# Patient Record
Sex: MALE | Race: WHITE | NOT HISPANIC OR LATINO | Employment: FULL TIME | URBAN - NONMETROPOLITAN AREA
[De-identification: names, ages, dates, MRNs, and addresses within clinical notes are randomized per-mention and may not be internally consistent; named-entity substitution may affect disease eponyms.]

---

## 2018-08-08 ENCOUNTER — OFFICE VISIT (OUTPATIENT)
Dept: FAMILY MEDICINE CLINIC | Facility: CLINIC | Age: 33
End: 2018-08-08

## 2018-08-08 ENCOUNTER — LAB (OUTPATIENT)
Dept: LAB | Facility: HOSPITAL | Age: 33
End: 2018-08-08

## 2018-08-08 VITALS
BODY MASS INDEX: 41.13 KG/M2 | HEIGHT: 69 IN | OXYGEN SATURATION: 99 % | HEART RATE: 70 BPM | WEIGHT: 277.7 LBS | SYSTOLIC BLOOD PRESSURE: 132 MMHG | DIASTOLIC BLOOD PRESSURE: 88 MMHG

## 2018-08-08 DIAGNOSIS — R03.0 PRE-HYPERTENSION: Primary | ICD-10-CM

## 2018-08-08 DIAGNOSIS — F32.A MILD DEPRESSION: ICD-10-CM

## 2018-08-08 DIAGNOSIS — R03.0 PRE-HYPERTENSION: ICD-10-CM

## 2018-08-08 LAB
ALBUMIN SERPL-MCNC: 4.3 G/DL (ref 3.4–4.8)
ALBUMIN/GLOB SERPL: 1.4 G/DL (ref 1.1–1.8)
ALP SERPL-CCNC: 44 U/L (ref 38–126)
ALT SERPL W P-5'-P-CCNC: 99 U/L (ref 21–72)
ANION GAP SERPL CALCULATED.3IONS-SCNC: 8 MMOL/L (ref 5–15)
ARTICHOKE IGE QN: 63 MG/DL (ref 1–129)
AST SERPL-CCNC: 65 U/L (ref 17–59)
BASOPHILS # BLD AUTO: 0.07 10*3/MM3 (ref 0–0.2)
BASOPHILS NFR BLD AUTO: 1.1 % (ref 0–2)
BILIRUB SERPL-MCNC: 0.5 MG/DL (ref 0.2–1.3)
BUN BLD-MCNC: 14 MG/DL (ref 7–21)
BUN/CREAT SERPL: 16.7 (ref 7–25)
CALCIUM SPEC-SCNC: 8.8 MG/DL (ref 8.4–10.2)
CHLORIDE SERPL-SCNC: 101 MMOL/L (ref 95–110)
CHOLEST SERPL-MCNC: 117 MG/DL (ref 0–199)
CO2 SERPL-SCNC: 29 MMOL/L (ref 22–31)
CREAT BLD-MCNC: 0.84 MG/DL (ref 0.7–1.3)
DEPRECATED RDW RBC AUTO: 39.9 FL (ref 35.1–43.9)
EOSINOPHIL # BLD AUTO: 0.09 10*3/MM3 (ref 0–0.7)
EOSINOPHIL NFR BLD AUTO: 1.4 % (ref 0–7)
ERYTHROCYTE [DISTWIDTH] IN BLOOD BY AUTOMATED COUNT: 13.3 % (ref 11.5–14.5)
GFR SERPL CREATININE-BSD FRML MDRD: 106 ML/MIN/1.73 (ref 70–162)
GLOBULIN UR ELPH-MCNC: 3 GM/DL (ref 2.3–3.5)
GLUCOSE BLD-MCNC: 97 MG/DL (ref 60–100)
HCT VFR BLD AUTO: 42.7 % (ref 39–49)
HDLC SERPL-MCNC: 36 MG/DL (ref 60–200)
HGB BLD-MCNC: 14.6 G/DL (ref 13.7–17.3)
IMM GRANULOCYTES # BLD: 0.07 10*3/MM3 (ref 0–0.02)
IMM GRANULOCYTES NFR BLD: 1.1 % (ref 0–0.5)
LDLC/HDLC SERPL: 1.93 {RATIO} (ref 0–3.55)
LYMPHOCYTES # BLD AUTO: 1.55 10*3/MM3 (ref 0.6–4.2)
LYMPHOCYTES NFR BLD AUTO: 24.1 % (ref 10–50)
MCH RBC QN AUTO: 28 PG (ref 26.5–34)
MCHC RBC AUTO-ENTMCNC: 34.2 G/DL (ref 31.5–36.3)
MCV RBC AUTO: 81.8 FL (ref 80–98)
MONOCYTES # BLD AUTO: 0.58 10*3/MM3 (ref 0–0.9)
MONOCYTES NFR BLD AUTO: 9 % (ref 0–12)
NEUTROPHILS # BLD AUTO: 4.06 10*3/MM3 (ref 2–8.6)
NEUTROPHILS NFR BLD AUTO: 63.3 % (ref 37–80)
PLATELET # BLD AUTO: 280 10*3/MM3 (ref 150–450)
PMV BLD AUTO: 9.5 FL (ref 8–12)
POTASSIUM BLD-SCNC: 4.2 MMOL/L (ref 3.5–5.1)
PROT SERPL-MCNC: 7.3 G/DL (ref 6.3–8.6)
RBC # BLD AUTO: 5.22 10*6/MM3 (ref 4.37–5.74)
SODIUM BLD-SCNC: 138 MMOL/L (ref 137–145)
TRIGL SERPL-MCNC: 57 MG/DL (ref 20–199)
WBC NRBC COR # BLD: 6.42 10*3/MM3 (ref 3.2–9.8)

## 2018-08-08 PROCEDURE — 36415 COLL VENOUS BLD VENIPUNCTURE: CPT

## 2018-08-08 PROCEDURE — 80061 LIPID PANEL: CPT | Performed by: STUDENT IN AN ORGANIZED HEALTH CARE EDUCATION/TRAINING PROGRAM

## 2018-08-08 PROCEDURE — 85025 COMPLETE CBC W/AUTO DIFF WBC: CPT | Performed by: STUDENT IN AN ORGANIZED HEALTH CARE EDUCATION/TRAINING PROGRAM

## 2018-08-08 PROCEDURE — 80053 COMPREHEN METABOLIC PANEL: CPT

## 2018-08-08 PROCEDURE — 99203 OFFICE O/P NEW LOW 30 MIN: CPT | Performed by: STUDENT IN AN ORGANIZED HEALTH CARE EDUCATION/TRAINING PROGRAM

## 2018-08-08 RX ORDER — TETANUS AND DIPHTHERIA TOXOIDS ADSORBED 2; 2 [LF]/.5ML; [LF]/.5ML
0.5 INJECTION INTRAMUSCULAR ONCE
Qty: 0.5 ML | Refills: 0 | Status: SHIPPED | OUTPATIENT
Start: 2018-08-08 | End: 2018-08-08

## 2018-08-08 NOTE — PROGRESS NOTES
Subjective   Barrett Sidhu is a 33 y.o. male who presents for initial evaluation for elevated blood pressure level, lab work for occupational insurance and to establish care. He had one episode of high blood pressure at his dentist office of 146/99 and today is 132/88. He does not complain of any headache, lightheadedness, or changes in his vision. He denies any chest pain or shortness of breath or peripheral edema. He stopped smoking cigarettes 5 months ago and switched to a vape pen and two weeks ago quit the vape pen. IN addition he has mild symptoms of depression. Fluctuating appetite, level of energy, and sleep patterns. 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 At times he lacks interest in activities and prefers to be alone. He has anxiety at stores but is able to attend concerts and go to the bar with his friends. Denies any intentions to hurt himself or others.     Preventative:  Over the past 2 weeks, have you felt down, depressed, or hopeless?Yes   Over the past 2 weeks, have you felt little interest or pleasure in doing things?Yes  Clinical depression screening refused by patient.No     On osteoporosis therapy?Not Indicated     Past Medical Hx:  Past Medical History:   Diagnosis Date   • Anxiety    • GERD (gastroesophageal reflux disease)        Past Surgical Hx:  No past surgical history on file.    Health Maintenance:  Health Maintenance   Topic Date Due   • ANNUAL PHYSICAL  06/05/1989   • TDAP/TD VACCINES (1 - Tdap) 06/05/2005   • INFLUENZA VACCINE  08/01/2018       Current Meds:  No current outpatient prescriptions on file.    Allergies:  Patient has no known allergies.    Family Hx:  Family History   Problem Relation Age of Onset   • Hypertension Mother    • Cancer Maternal Aunt    • Diabetes Maternal Grandmother    • Cancer Maternal Grandfather         Social History:  Social History     Social History   • Marital status: Single     Spouse name: N/A   • Number of children: N/A   • Years  "of education: N/A     Occupational History   • [employer]      Social History Main Topics   • Smoking status: Former Smoker     Quit date: 7/25/2018   • Smokeless tobacco: Never Used   • Alcohol use Yes      Comment: rare   • Drug use: Yes     Types: Marijuana   • Sexual activity: Not Currently     Other Topics Concern   • Not on file     Social History Narrative   • No narrative on file       Review of Systems  Review of Systems   Constitutional: Positive for appetite change and fatigue. Negative for fever and unexpected weight change.   HENT: Negative for sore throat and trouble swallowing.    Eyes: Negative for photophobia and visual disturbance.   Respiratory: Negative for cough, choking, chest tightness and shortness of breath.    Cardiovascular: Negative for chest pain and leg swelling.   Gastrointestinal: Negative for abdominal distention and abdominal pain.   Genitourinary: Negative for difficulty urinating and dysuria.   Musculoskeletal: Positive for back pain. Negative for arthralgias, myalgias and neck pain.   Skin: Negative for color change and pallor.   Neurological: Negative for dizziness, light-headedness and headaches.   Psychiatric/Behavioral: Negative for agitation and behavioral problems.            Objective:     /88   Pulse 70   Ht 175.3 cm (69\")   Wt 126 kg (277 lb 11.2 oz)   SpO2 99%   BMI 41.01 kg/m²       Physical Exam   Constitutional: He is oriented to person, place, and time. He appears well-developed and well-nourished.   HENT:   Head: Normocephalic and atraumatic.   Right Ear: External ear normal.   Left Ear: External ear normal.   Eyes: Pupils are equal, round, and reactive to light. EOM are normal.   Neck: Normal range of motion. Neck supple.   Cardiovascular: Normal rate, regular rhythm and normal heart sounds.  Exam reveals no gallop and no friction rub.    No murmur heard.  Pulmonary/Chest: Effort normal and breath sounds normal. No respiratory distress. He has no wheezes. " He has no rales.   Abdominal: Soft. Bowel sounds are normal. He exhibits no distension. There is no tenderness.   Musculoskeletal: Normal range of motion. He exhibits no edema.   Neurological: He is alert and oriented to person, place, and time.   Skin: Skin is warm. Capillary refill takes less than 2 seconds. No erythema.   Psychiatric: He has a normal mood and affect. His behavior is normal.          Assessment/Plan:     1. Pre-hypertension    2. Mild depression (CMS/HCC)       Plan:  1. Pre-hypertension  - Monitor blood pressure values and keep a log. He was instructed to find a blood pressure measuring machine and to take his BP, wait a few minutes and take it again.  - Begin to exercise and eat healthier.  - F/U CBC, CMP, Fasting blood glucose and lipid panel. Latter for insurance purposes as well. Call patient to fill out form.  - RTC in one month or if new symptoms arise    2. Mild depression  - Begin to exercise routinely to try and stimulate self esteem and aid in better sleep and more stable appetite.  - RTC in one month to assess progress of symptoms.    Follow-up:     Return in about 4 weeks (around 9/5/2018).    Goals     • Exercise 150 minutes per week (moderate activity)     • Weight (lb) < 113 kg (250 lb)           Preventative:  Male Preventative: Goiign to begin healthier diet and exercise.  Vaccines:   Tetanus vaccine: unknown  Annual influenza vaccine: unknown   Pneumococcal vaccine: unknown  Hep B vaccine: unknown   Zoster vaccine:unknown    Has stopped smoking two weeks ago and will continue abstaining.  does not drink  eat more fruits and vegetables, decrease soda or juice intake and increase physical activity    RISK SCORE: 3          This document has been electronically signed by Herbert Gee MD on August 8, 2018 4:47 PM

## 2019-04-08 ENCOUNTER — OFFICE VISIT (OUTPATIENT)
Dept: FAMILY MEDICINE CLINIC | Facility: CLINIC | Age: 34
End: 2019-04-08

## 2019-04-08 ENCOUNTER — TELEPHONE (OUTPATIENT)
Dept: FAMILY MEDICINE CLINIC | Facility: CLINIC | Age: 34
End: 2019-04-08

## 2019-04-08 VITALS
SYSTOLIC BLOOD PRESSURE: 150 MMHG | BODY MASS INDEX: 43.89 KG/M2 | OXYGEN SATURATION: 97 % | HEIGHT: 69 IN | DIASTOLIC BLOOD PRESSURE: 98 MMHG | WEIGHT: 296.3 LBS | HEART RATE: 88 BPM

## 2019-04-08 DIAGNOSIS — F41.9 ANXIETY AND DEPRESSION: Primary | ICD-10-CM

## 2019-04-08 DIAGNOSIS — K21.9 GASTROESOPHAGEAL REFLUX DISEASE, ESOPHAGITIS PRESENCE NOT SPECIFIED: ICD-10-CM

## 2019-04-08 DIAGNOSIS — Z13.6 SCREENING FOR HYPERTENSION: ICD-10-CM

## 2019-04-08 DIAGNOSIS — R73.01 IMPAIRED FASTING BLOOD SUGAR: ICD-10-CM

## 2019-04-08 DIAGNOSIS — F32.A ANXIETY AND DEPRESSION: Primary | ICD-10-CM

## 2019-04-08 PROCEDURE — 99213 OFFICE O/P EST LOW 20 MIN: CPT | Performed by: STUDENT IN AN ORGANIZED HEALTH CARE EDUCATION/TRAINING PROGRAM

## 2019-04-08 NOTE — PROGRESS NOTES
I have seen the patient.  I have reviewed the notes, assessments, and/or procedures performed by Dr. Gee, I concur with her/his documentation and assessment and plan for Barrett Sidhu.           This document has been electronically signed by Dione Trejo MD on April 8, 2019 2:25 PM

## 2019-04-08 NOTE — TELEPHONE ENCOUNTER
Heather from [facility] on [facility] called and is needing some scripts called in. He was just here earlier. They would like a call back please. The pharmacist name is Heather and the number is [phone].      Thank you,      Verónica

## 2019-04-09 ENCOUNTER — TELEPHONE (OUTPATIENT)
Dept: FAMILY MEDICINE CLINIC | Facility: CLINIC | Age: 34
End: 2019-04-09

## 2019-04-09 NOTE — TELEPHONE ENCOUNTER
Patient was in office today asking about blood work that was requested yesterday at visit. Nothing was at the lab when he came in to get them. He was also inquiring about a medication that you both had discussed during the visit for antidepressant. He was also asking about the referral to the nutritionist, wondered how long it might be before he hears something about that.

## 2019-04-10 ENCOUNTER — LAB (OUTPATIENT)
Dept: LAB | Facility: HOSPITAL | Age: 34
End: 2019-04-10

## 2019-04-10 DIAGNOSIS — R73.01 IMPAIRED FASTING BLOOD SUGAR: ICD-10-CM

## 2019-04-10 LAB
ALBUMIN SERPL-MCNC: 4.6 G/DL (ref 3.5–5.2)
ALBUMIN/GLOB SERPL: 1.6 G/DL
ALP SERPL-CCNC: 51 U/L (ref 39–117)
ALT SERPL W P-5'-P-CCNC: 72 U/L (ref 1–41)
ANION GAP SERPL CALCULATED.3IONS-SCNC: 12.2 MMOL/L
AST SERPL-CCNC: 39 U/L (ref 1–40)
BILIRUB SERPL-MCNC: 0.4 MG/DL (ref 0.2–1.2)
BUN BLD-MCNC: 14 MG/DL (ref 6–20)
BUN/CREAT SERPL: 14.7 (ref 7–25)
CALCIUM SPEC-SCNC: 9.4 MG/DL (ref 8.6–10.5)
CHLORIDE SERPL-SCNC: 96 MMOL/L (ref 98–107)
CO2 SERPL-SCNC: 27.8 MMOL/L (ref 22–29)
CREAT BLD-MCNC: 0.95 MG/DL (ref 0.76–1.27)
GFR SERPL CREATININE-BSD FRML MDRD: 92 ML/MIN/1.73
GLOBULIN UR ELPH-MCNC: 2.9 GM/DL
GLUCOSE BLD-MCNC: 94 MG/DL (ref 65–99)
POTASSIUM BLD-SCNC: 4.3 MMOL/L (ref 3.5–5.2)
PROT SERPL-MCNC: 7.5 G/DL (ref 6–8.5)
SODIUM BLD-SCNC: 136 MMOL/L (ref 136–145)

## 2019-04-10 PROCEDURE — 80053 COMPREHEN METABOLIC PANEL: CPT

## 2019-04-10 PROCEDURE — 36415 COLL VENOUS BLD VENIPUNCTURE: CPT

## 2019-04-10 RX ORDER — ESCITALOPRAM OXALATE 10 MG/1
10 TABLET ORAL DAILY
Qty: 30 TABLET | Refills: 0 | Status: SHIPPED | OUTPATIENT
Start: 2019-04-10 | End: 2019-05-06 | Stop reason: SDUPTHER

## 2019-04-15 RX ORDER — OMEPRAZOLE 20 MG/1
20 TABLET, DELAYED RELEASE ORAL DAILY
Qty: 30 TABLET | Refills: 0 | Status: SHIPPED | OUTPATIENT
Start: 2019-04-15 | End: 2019-05-17 | Stop reason: SDUPTHER

## 2019-04-15 NOTE — PROGRESS NOTES
Subjective   Barrett Sidhu is a 33 y.o. male who presents for initial evaluation  for chest tightness. It feel as if someone is holding his heart softly, non-radiating, waxing and waning with stress as an exacerbating factor and relaxation as a relieving factor. He has experienced the tightness when starting his workout but jail through the workout the pain disappears. He asserts that his life is extra stressful at the moment since he is buying a home, already owns a house, and his dog was recently injured. In addition, he has increased sleep, decreased interest in his hobbies, decreased concentration, increased appetite, slowed psychomotor symptoms, and denies any suicidal ideation. His EDER-7 score was 16.    The main reason for setting up this appointment was due to reflux like symptoms bu they resolved one he started taking OTC prilosec. He is now asymptomatic for GERD like symptoms.       Past Medical Hx:  Past Medical History:   Diagnosis Date   • Anxiety    • GERD (gastroesophageal reflux disease)        Past Surgical Hx:  History reviewed. No pertinent surgical history.    Health Maintenance:  Health Maintenance   Topic Date Due   • ANNUAL PHYSICAL  06/05/1989   • TDAP/TD VACCINES (1 - Tdap) 05/02/2019 (Originally 6/5/2005)   • INFLUENZA VACCINE  08/01/2019       Current Meds:    Current Outpatient Medications:   •  escitalopram (LEXAPRO) 10 MG tablet, Take 1 tablet by mouth Daily., Disp: 30 tablet, Rfl: 0  •  omeprazole OTC (PRILOSEC OTC) 20 MG EC tablet, Take 1 tablet by mouth Daily., Disp: 30 tablet, Rfl: 0    Allergies:  Patient has no known allergies.    Family Hx:  Family History   Problem Relation Age of Onset   • Hypertension Mother    • Cancer Maternal Aunt    • Diabetes Maternal Grandmother    • Cancer Maternal Grandfather         Social History:  Social History     Socioeconomic History   • Marital status: Single     Spouse name: Not on file   • Number of children:  "Not on file   • Years of education: Not on file   • Highest education level: Not on file   Occupational History   • Occupation: [employer]   Tobacco Use   • Smoking status: Former Smoker     Last attempt to quit: 2018     Years since quittin.7   • Smokeless tobacco: Never Used   Substance and Sexual Activity   • Alcohol use: Yes     Comment: rare   • Drug use: Yes     Types: Marijuana   • Sexual activity: Not Currently       Review of Systems  Review of Systems   Constitutional: Negative for activity change, appetite change and fever.   HENT: Negative for congestion and trouble swallowing.    Respiratory: Negative for chest tightness and shortness of breath.    Cardiovascular: Negative for chest pain and palpitations.   Gastrointestinal: Negative for abdominal distention and abdominal pain.   Genitourinary: Negative for difficulty urinating and dysuria.   Musculoskeletal: Negative for arthralgias and myalgias.   Skin: Negative for color change and pallor.   Neurological: Negative for weakness, light-headedness and headaches.   Psychiatric/Behavioral: Positive for decreased concentration, dysphoric mood and sleep disturbance. Negative for agitation, behavioral problems and suicidal ideas. The patient is nervous/anxious.             Objective:     /98   Pulse 88   Ht 175.3 cm (69\")   Wt 134 kg (296 lb 4.8 oz)   SpO2 97%   BMI 43.76 kg/m²       Physical Exam   Constitutional: He is oriented to person, place, and time. He appears well-developed and well-nourished.   HENT:   Head: Normocephalic and atraumatic.   Right Ear: External ear normal.   Left Ear: External ear normal.   Eyes: EOM are normal. Pupils are equal, round, and reactive to light.   Neck: Normal range of motion. Neck supple.   Cardiovascular: Normal rate, regular rhythm and normal heart sounds. Exam reveals no gallop and no friction rub.   No murmur heard.  Pulmonary/Chest: Effort normal and breath sounds normal. No respiratory distress. " He has no wheezes. He has no rales.   Abdominal: Soft. Bowel sounds are normal. He exhibits no distension. There is no tenderness.   Musculoskeletal: Normal range of motion. He exhibits no edema.   Neurological: He is alert and oriented to person, place, and time.   Skin: Skin is warm. No erythema.   Psychiatric: His behavior is normal. His mood appears anxious.       Assessment/Plan:     1. Anxiety and depression   - New chest symptoms appears to be anxiety/stress related due to exacerbating and relieving factors and characteristics of pain.  - Begin Lexapro 10mg daily to try and tame anxiety and depression.     2. Gastroesophageal reflux disease, esophagitis presence not specified   - Taking OTC prilosec with complete relief     3. Impaired fasting blood sugar   - Screening fasting blood glucose for possible glucose management impairment.   - BMP     4. Screening of hypertension  - Due to elevated blood pressures at previous visits he will be asked to eat healthy, continue going to the gym, and have is BP rechecked either at the clinic or anywhere that a BP can be obtianed.   - Return in a month to reassess symptoms.          Follow-up:     Return in about 4 weeks (around 5/6/2019) for reassesment of depression symptms after initiation of treatment, BP, and glucose..    Goals     • Exercise 150 minutes per week (moderate activity)     • Weight (lb) < 113 kg (250 lb)           Preventative:    Vaccines Recommended at this visit:   TDaP/TD    Vaccines Received at this visit:  Patient refused all vaccinations at this visit.    Screenings Recommended at this visit:  No Screenings offered today. Patient is up to date on all screenings at this time.     Screenings Ordered at this visit:  No screenings were offered today. Patient is up to date on all screenings.     Smoking Status:  Patient is current smoker. Patient is not interested in smoking cessation.    Alcohol Intake:  Occasional/rare    Patient's Body mass index  is 43.76 kg/m². BMI is above normal parameters. Recommendations include: exercise counseling and nutrition counseling.    RISK SCORE: 2          This document has been electronically signed by Herbert Gee MD on April 15, 2019 1:18 AM

## 2019-04-18 ENCOUNTER — DOCUMENTATION (OUTPATIENT)
Dept: FAMILY MEDICINE CLINIC | Facility: CLINIC | Age: 34
End: 2019-04-18

## 2019-04-18 NOTE — PROGRESS NOTES
Mr Thea came in today to get his BP checked, it was done in his right arm with a large adult cuff, the reading was 142/82. He would like it noted in his chart so they have the readings when he comes in next month.   Thank you

## 2019-04-24 DIAGNOSIS — E66.01 CLASS 3 SEVERE OBESITY WITHOUT SERIOUS COMORBIDITY WITH BODY MASS INDEX (BMI) OF 40.0 TO 44.9 IN ADULT, UNSPECIFIED OBESITY TYPE (HCC): Primary | ICD-10-CM

## 2019-05-06 RX ORDER — ESCITALOPRAM OXALATE 10 MG/1
10 TABLET ORAL DAILY
Qty: 30 TABLET | Refills: 0 | Status: SHIPPED | OUTPATIENT
Start: 2019-05-06 | End: 2019-05-17 | Stop reason: SDUPTHER

## 2019-05-17 ENCOUNTER — OFFICE VISIT (OUTPATIENT)
Dept: FAMILY MEDICINE CLINIC | Facility: CLINIC | Age: 34
End: 2019-05-17

## 2019-05-17 VITALS
BODY MASS INDEX: 42.8 KG/M2 | DIASTOLIC BLOOD PRESSURE: 78 MMHG | SYSTOLIC BLOOD PRESSURE: 120 MMHG | HEART RATE: 68 BPM | WEIGHT: 289 LBS | OXYGEN SATURATION: 100 % | HEIGHT: 69 IN

## 2019-05-17 DIAGNOSIS — F41.9 ANXIETY AND DEPRESSION: Primary | ICD-10-CM

## 2019-05-17 DIAGNOSIS — K21.9 GASTROESOPHAGEAL REFLUX DISEASE, ESOPHAGITIS PRESENCE NOT SPECIFIED: ICD-10-CM

## 2019-05-17 DIAGNOSIS — F32.A ANXIETY AND DEPRESSION: Primary | ICD-10-CM

## 2019-05-17 DIAGNOSIS — R03.0 PRE-HYPERTENSION: ICD-10-CM

## 2019-05-17 DIAGNOSIS — R73.09 BLOOD GLUCOSE PROBLEM: ICD-10-CM

## 2019-05-17 PROCEDURE — 99213 OFFICE O/P EST LOW 20 MIN: CPT | Performed by: STUDENT IN AN ORGANIZED HEALTH CARE EDUCATION/TRAINING PROGRAM

## 2019-05-17 RX ORDER — OMEPRAZOLE 20 MG/1
20 TABLET, DELAYED RELEASE ORAL DAILY
Qty: 30 TABLET | Refills: 2 | Status: SHIPPED | OUTPATIENT
Start: 2019-05-17

## 2019-05-17 RX ORDER — ESCITALOPRAM OXALATE 10 MG/1
10 TABLET ORAL DAILY
Qty: 30 TABLET | Refills: 2 | Status: SHIPPED | OUTPATIENT
Start: 2019-05-17 | End: 2019-09-16 | Stop reason: SDUPTHER

## 2019-05-17 NOTE — PROGRESS NOTES
I have reviewed the notes, assessments, and/or procedures performed by Herbert Gee MD, I concur with her/his documentation and assessment and plan for Barrett Sidhu.                This document has been electronically signed by Kenney Kam MD on May 17, 2019 10:47 AM

## 2019-05-17 NOTE — PROGRESS NOTES
Subjective   Barrett Sidhu is a 33 y.o. male who presents for follow up for anxiety/depression, glucose control, hypertension, and GERD.    Anxiety/Depression  Is taking Lexapro 10 mg with much improvement in symptoms.  His sleep is improving as he has gotten a new, bigger bed, and due to increase interest has been going outside more (fixing his bike and trying to ride it again, and going on random walks in [facility] with his friends when he is unoccupied) and in turn has been more tired so it is easier for him to fall asleep and stay asleep.  His energy is greatly increased, concentration is great, and appetite is normal for him. He denies any suicidal ideation.    Glucose control  Due to elevated blood pressure and body habitus glucose was checked on his previous visit via CMP which was 94.  He has started to reduce sugar intake and begin eating a healthier diet.  He has lost 7 pounds since his last visit.    Hypertension  Blood pressure today is 120/78.  He denies any headache, changes in his vision, chest pain, or shortness of breath.    GERD  During his previous visit his GERD symptoms have resolved with over-the-counter Prilosec.  He continues to be asymptomatic, and when experiences symptoms takes some over-the-counter Prilosec until his symptoms have resolved.  He is currently asymptomatic.      Past Medical Hx:  Past Medical History:   Diagnosis Date   • Anxiety    • GERD (gastroesophageal reflux disease)        Past Surgical Hx:  History reviewed. No pertinent surgical history.    Health Maintenance:  Health Maintenance   Topic Date Due   • ANNUAL PHYSICAL  06/05/1989   • TDAP/TD VACCINES (1 - Tdap) 06/05/2005   • INFLUENZA VACCINE  08/01/2019       Current Meds:    Current Outpatient Medications:   •  escitalopram (LEXAPRO) 10 MG tablet, Take 1 tablet by mouth Daily., Disp: 30 tablet, Rfl: 0  •  omeprazole OTC (PRILOSEC OTC) 20 MG EC tablet, Take 1 tablet by mouth  "Daily., Disp: 30 tablet, Rfl: 0    Allergies:  Patient has no known allergies.    Family Hx:  Family History   Problem Relation Age of Onset   • Hypertension Mother    • Cancer Maternal Aunt    • Diabetes Maternal Grandmother    • Cancer Maternal Grandfather         Social History:  Social History     Socioeconomic History   • Marital status: Single     Spouse name: Not on file   • Number of children: Not on file   • Years of education: Not on file   • Highest education level: Not on file   Occupational History   • Occupation: [employer]   Tobacco Use   • Smoking status: Former Smoker     Last attempt to quit: 2018     Years since quittin.8   • Smokeless tobacco: Never Used   Substance and Sexual Activity   • Alcohol use: No     Frequency: Never     Comment: rare   • Drug use: Yes     Types: Marijuana   • Sexual activity: Not Currently       Review of Systems  Review of Systems   Constitutional: Negative for activity change, appetite change and fever.   HENT: Negative for congestion and trouble swallowing.    Respiratory: Negative for chest tightness and shortness of breath.    Cardiovascular: Negative for chest pain and palpitations.   Gastrointestinal: Negative for abdominal distention and abdominal pain.   Genitourinary: Negative for difficulty urinating and dysuria.   Musculoskeletal: Negative for arthralgias and myalgias.   Skin: Negative for color change and pallor.   Neurological: Negative for weakness, light-headedness and headaches.   Psychiatric/Behavioral: Positive for sleep disturbance (Improving). Negative for agitation, behavioral problems, decreased concentration, dysphoric mood and suicidal ideas. The patient is not nervous/anxious.             Objective:     /78 (BP Location: Left arm, Patient Position: Sitting, Cuff Size: Large Adult)   Pulse 68   Ht 175.3 cm (69\")   Wt 131 kg (289 lb)   SpO2 100%   BMI 42.68 kg/m²       Physical Exam   Constitutional: He is oriented to person, " place, and time. He appears well-developed and well-nourished.   HENT:   Head: Normocephalic and atraumatic.   Right Ear: External ear normal.   Left Ear: External ear normal.   Nose: Nose normal.   Eyes: EOM are normal. Pupils are equal, round, and reactive to light.   Neck: Normal range of motion. Neck supple.   Cardiovascular: Normal rate, regular rhythm and normal heart sounds. Exam reveals no gallop and no friction rub.   No murmur heard.  Pulmonary/Chest: Effort normal and breath sounds normal. No respiratory distress. He has no wheezes. He has no rales.   Abdominal: Soft. Bowel sounds are normal. He exhibits no distension. There is no tenderness.   Musculoskeletal: Normal range of motion. He exhibits no edema.   Neurological: He is alert and oriented to person, place, and time.   Skin: Skin is warm. No erythema.   Psychiatric: He has a normal mood and affect. His behavior is normal.       Assessment/Plan:     1. Anxiety and depression   -Continue Lexapro 10 mg daily as symptoms are improving and well-controlled.     2. Pre-hypertension   -Blood pressure is normal.  Continue eating a healthy diet, and exercising.  -No intervention needed as of now.     3. Gastroesophageal reflux disease, esophagitis presence not specified   -Continue OTC Prilosec as needed     4. Blood glucose problem   -CMP showed glucose of 94.  No intervention needed as of now.          Follow-up:     Return in about 3 months (around 8/17/2019). To recheck depression/anxiety symptoms. May review blood pressure and GERD symptoms as well.  He will bring in a form for work and I will be more than happy to review it without an office visit.    Goals     • Exercise 150 minutes per week (moderate activity)     • Weight (lb) < 113 kg (250 lb)           Preventative:    Vaccines Recommended at this visit:   None    Vaccines Received at this visit:  None    Screenings Recommended at this visit:  No Screenings offered today. Patient is up to date on  all screenings at this time.     Screenings Ordered at this visit:  No screenings were offered today. Patient is up to date on all screenings.     Smoking Status:  Patient is a former smoker.    Alcohol Intake:  Occasional/rare    Patient's Body mass index is 42.68 kg/m². BMI is above normal parameters. Recommendations include: exercise counseling and nutrition counseling.       RISK SCORE: 1          This document has been electronically signed by Herbert Gee MD on May 17, 2019 10:19 AM

## 2019-08-07 ENCOUNTER — OFFICE VISIT (OUTPATIENT)
Dept: FAMILY MEDICINE CLINIC | Facility: CLINIC | Age: 34
End: 2019-08-07

## 2019-08-07 VITALS
DIASTOLIC BLOOD PRESSURE: 82 MMHG | HEART RATE: 75 BPM | OXYGEN SATURATION: 99 % | BODY MASS INDEX: 42.65 KG/M2 | SYSTOLIC BLOOD PRESSURE: 130 MMHG | WEIGHT: 288 LBS | HEIGHT: 69 IN

## 2019-08-07 DIAGNOSIS — R03.0 PREHYPERTENSION: ICD-10-CM

## 2019-08-07 DIAGNOSIS — K21.9 GASTROESOPHAGEAL REFLUX DISEASE, ESOPHAGITIS PRESENCE NOT SPECIFIED: ICD-10-CM

## 2019-08-07 DIAGNOSIS — F41.9 ANXIETY AND DEPRESSION: Primary | ICD-10-CM

## 2019-08-07 DIAGNOSIS — F32.A ANXIETY AND DEPRESSION: Primary | ICD-10-CM

## 2019-08-07 PROCEDURE — 99213 OFFICE O/P EST LOW 20 MIN: CPT | Performed by: STUDENT IN AN ORGANIZED HEALTH CARE EDUCATION/TRAINING PROGRAM

## 2019-08-08 NOTE — PROGRESS NOTES
Subjective   Barrett Sidhu is a 34 y.o. male who presents for follow up for anxiety/depression, GERD, and pre-hypertension.    Anxiety/depression   He denies any depressive mood, and expresses interest in his hobbies which include walking around downtown with his friends.  He has  experienced 2 episodes in the last 3 months of anxiety attacks which he can control just by breathing techniques and consciously calming himself down.  In those episodes he had mild chest tightness which resolves with the episode.  He denies any palpitations.  He believes the medication is working well and would like to continue it    GERD  He has been taking his Prilosec which has resolved his GERD symptoms.    Pre-hypertension  He does not check his blood pressures at home.  He does continue to eat a low-salt and low sugar diet, and exercises by walking.  He was referred to a nutritionist previously but did not answer phone numbers he did not recognize so his referral was closed.  He will like another referral to go see a nutritionist.  He denies any headaches, changes in his vision, chest pain, palpitations, or shortness of breath.      Past Medical Hx:  Past Medical History:   Diagnosis Date   • Anxiety    • GERD (gastroesophageal reflux disease)        Past Surgical Hx:  History reviewed. No pertinent surgical history.    Health Maintenance:  Health Maintenance   Topic Date Due   • ANNUAL PHYSICAL  06/05/1989   • TDAP/TD VACCINES (1 - Tdap) 06/05/2005   • INFLUENZA VACCINE  08/01/2019       Current Meds:    Current Outpatient Medications:   •  escitalopram (LEXAPRO) 10 MG tablet, Take 1 tablet by mouth Daily., Disp: 30 tablet, Rfl: 2  •  omeprazole OTC (PRILOSEC OTC) 20 MG EC tablet, Take 1 tablet by mouth Daily., Disp: 30 tablet, Rfl: 2    Allergies:  Patient has no known allergies.    Family Hx:  Family History   Problem Relation Age of Onset   • Hypertension Mother    • Cancer Maternal Aunt    • Diabetes  "Maternal Grandmother    • Cancer Maternal Grandfather         Social History:  Social History     Socioeconomic History   • Marital status: Single     Spouse name: Not on file   • Number of children: Not on file   • Years of education: Not on file   • Highest education level: Not on file   Occupational History   • Occupation: [employer]   Tobacco Use   • Smoking status: Former Smoker     Last attempt to quit: 2018     Years since quittin.0   • Smokeless tobacco: Never Used   Substance and Sexual Activity   • Alcohol use: No     Frequency: Never     Comment: rare   • Drug use: Yes     Types: Marijuana   • Sexual activity: Not Currently       Review of Systems  Review of Systems   Constitutional: Negative for activity change, appetite change and fever.   HENT: Negative for congestion and trouble swallowing.    Respiratory: Negative for chest tightness and shortness of breath.    Cardiovascular: Negative for chest pain and palpitations.   Gastrointestinal: Negative for abdominal distention and abdominal pain.   Genitourinary: Negative for difficulty urinating and dysuria.   Musculoskeletal: Negative for arthralgias and myalgias.   Skin: Negative for color change and pallor.   Neurological: Negative for weakness, light-headedness and headaches.   Psychiatric/Behavioral: Negative for agitation and behavioral problems.            Objective:     /82   Pulse 75   Ht 175.3 cm (69\")   Wt 131 kg (288 lb)   SpO2 99%   BMI 42.53 kg/m²       Physical Exam   Constitutional: He is oriented to person, place, and time. He appears well-developed and well-nourished.   HENT:   Head: Normocephalic and atraumatic.   Right Ear: External ear normal.   Left Ear: External ear normal.   Eyes: EOM are normal. Pupils are equal, round, and reactive to light.   Neck: Normal range of motion. Neck supple.   Cardiovascular: Normal rate, regular rhythm and normal heart sounds. Exam reveals no gallop and no friction rub.   No murmur " heard.  Pulmonary/Chest: Effort normal and breath sounds normal. No respiratory distress. He has no wheezes. He has no rales.   Abdominal: Soft. Bowel sounds are normal. He exhibits no distension. There is no tenderness.   Musculoskeletal: Normal range of motion. He exhibits no edema.   Neurological: He is alert and oriented to person, place, and time.   Skin: Skin is warm. No erythema.   Psychiatric: He has a normal mood and affect. His behavior is normal.       Assessment/Plan:     1. Anxiety and depression   -Continue Lexapro 10 mg daily     2. Gastroesophageal reflux disease, esophagitis presence not specified   -Continue omeprazole 20 mg daily     3. Prehypertension   -Continue eating healthy, and exercising  -Nutritionist referral        Follow-up:     Return in about 6 months (around 2/7/2020).  To reassess anxiety control, and blood pressure.    Goals     • Exercise 150 minutes per week (moderate activity)     • Weight (lb) < 113 kg (250 lb)           Preventative:    Vaccines Recommended at this visit:   None    Vaccines Received at this visit:  None    Screenings Recommended at this visit:  No Screenings offered today. Patient is up to date on all screenings at this time.     Screenings Ordered at this visit:  No screenings were offered today. Patient is up to date on all screenings.     Smoking Status:  Patient is a former smoker.    Alcohol Intake:  Patient does not drink    Patient's Body mass index is 42.53 kg/m². BMI is above normal parameters. Recommendations include: exercise counseling, nutrition counseling and referral to a nutritionist.         RISK SCORE: 2          This document has been electronically signed by Herbert Gee MD on August 8, 2019 6:51 PM

## 2019-08-13 NOTE — PROGRESS NOTES
I have reviewed the notes, assessments, and/or procedures performed by Herbert Gee MD , I concur with her/his documentation of Barrett Sidhu.

## 2019-09-05 ENCOUNTER — HOSPITAL ENCOUNTER (OUTPATIENT)
Dept: NUTRITION | Facility: HOSPITAL | Age: 34
Discharge: HOME | End: 2019-09-05
Admitting: DIETITIAN, REGISTERED

## 2019-09-05 VITALS — BODY MASS INDEX: 42.65 KG/M2 | HEIGHT: 69 IN | WEIGHT: 288 LBS

## 2019-09-05 PROCEDURE — 97802 MEDICAL NUTRITION INDIV IN: CPT | Performed by: DIETITIAN, REGISTERED

## 2019-09-05 NOTE — PROGRESS NOTES
"Adult Outpatient Nutrition  Assessment    Patient Name:  Barrett Sidhu  YOB: 1985  MRN: [id number]    Assessment Date:  9/5/2019    Comments:  Pt was referred by dr. Gee for prehypertension. Pt is interested in weight mgt too. He wants to learn all about nutrition so he can meet his weight loss goals. He voices motivation to follow the meal plan and begin an exercise program we discussed. Phone number provided if questions arise. Follow up appt is scheduled for 1 month. This RDN will follow then.    General Info     Row Name 09/05/19 1540       Today's Session    Person(s) attending today's session  Patient       General Information    Lives With  alone        Physical Findings     Row Name 09/05/19 1540          Physical Findings    Overall Physical Appearance  obese         Anthropometrics     Row Name 09/05/19 1540          Anthropometrics    Height  175.3 cm (69.02\")     Weight  131 kg (288 lb)        Ideal Body Weight (IBW)    Ideal Body Weight (IBW) (kg)  73.73     % Ideal Body Weight  177.18        Body Mass Index (BMI)    BMI (kg/m2)  42.6         Nutritional Info/Activity     Row Name 09/05/19 1541       Nutritional Information    Have you had weight changes?  Yes    Describe weight changes  lost 8 lb by eating less/ drinking less sugar    What is your desired body weight?  90.7 kg (200 lb)    Have you tried to lose weight before?  Yes    List programs tried, date, and success  cutting back on intake and sweet drinks    What is your motivation to lose weight?  stop taking his blood pressure medicine    Functional Status  able to prepare meals;able to purchase food;ambulatory    What is the biggest challenge you have with your diet?  Knowledge;Eating out;Poor choices;Portions       Eating Environment    Eating environment  Alone       Physical Activity    Are you currently involved in an activity/exercise program?   No    Reasons for Inactivity  Other (comment) loses motivation " "and doesn't have an exercise anshu        Home Nutrition Report     Row Name 09/05/19 1543          Home Nutrition Report    Typical Food/Fluid Intake  pt is eating two meals per day which is up from 1 meal per day previously.     Food Preferences  likes all foods         Estimated/Assessed Needs     Row Name 09/05/19 1544 09/05/19 1540       Calculation Measurements    Height  --  175.3 cm (69.02\")       Estimated/Assessed Needs    Additional Documentation  Calorie Requirements (Group)  --       Calorie Requirements    Estimated Calorie Requirement (kcal/day)  1900  --          Labs/Tests/Procedures/Meds     Row Name 09/05/19 1544          Labs/Procedures/Meds    Lab Results Reviewed  reviewed, pertinent             Electronically signed by:  Kerrie Medeiros RD  09/05/19 3:47 PM   "

## 2019-09-10 RX ORDER — ESCITALOPRAM OXALATE 10 MG/1
10 TABLET ORAL DAILY
Qty: 30 TABLET | Refills: 5 | Status: CANCELLED | OUTPATIENT
Start: 2019-09-10

## 2019-09-13 ENCOUNTER — TELEPHONE (OUTPATIENT)
Dept: FAMILY MEDICINE CLINIC | Facility: CLINIC | Age: 34
End: 2019-09-13

## 2019-09-13 RX ORDER — ESCITALOPRAM OXALATE 10 MG/1
10 TABLET ORAL DAILY
Qty: 30 TABLET | Refills: 2 | Status: CANCELLED | OUTPATIENT
Start: 2019-09-13

## 2019-09-13 NOTE — TELEPHONE ENCOUNTER
Patient called asking for a refill on his escitalopram (LEXAPRO) 10 MG tablet.  [facility]    Thanks,  Elsi

## 2019-09-16 ENCOUNTER — TELEPHONE (OUTPATIENT)
Dept: FAMILY MEDICINE CLINIC | Facility: CLINIC | Age: 34
End: 2019-09-16

## 2019-09-16 NOTE — TELEPHONE ENCOUNTER
Pt called and is completely out of his escitalopram (LEXAPRO) 10 MG tablet and would like for it to be called into [facility] on [facility]. He called Friday.      Thank you,      Verónica

## 2019-09-18 RX ORDER — ESCITALOPRAM OXALATE 10 MG/1
10 TABLET ORAL DAILY
Qty: 30 TABLET | Refills: 5 | Status: SHIPPED | OUTPATIENT
Start: 2019-09-18 | End: 2020-04-20 | Stop reason: SDUPTHER

## 2019-10-15 ENCOUNTER — HOSPITAL ENCOUNTER (OUTPATIENT)
Dept: NUTRITION | Facility: HOSPITAL | Age: 34
Discharge: HOME | End: 2019-10-15
Admitting: FAMILY MEDICINE

## 2019-10-15 VITALS — WEIGHT: 283 LBS | BODY MASS INDEX: 41.77 KG/M2

## 2019-10-15 PROCEDURE — 97803 MED NUTRITION INDIV SUBSEQ: CPT | Performed by: DIETITIAN, REGISTERED

## 2019-10-15 NOTE — PROGRESS NOTES
Adult Nutrition  Assessment    Patient Name:  Barrett Sidhu  YOB: 1985  MRN: [id number]  Admit Date:  10/15/2019    Assessment Date:  10/15/2019    Comments:  Pt returned for a follow up visit with weight loss as expected. He weighed on the [facility] scale to track his weight at the next visit which will be in early December. Pt wants to stretch out his 6 visits insurance has approved. Pt has made many changes in his eating habits and is pleased with his progress. Will make his provider aware and continue to monitor.      Nutrition/Diet History     Row Name 10/15/19 1517 10/15/19 1516       Nutrition/Diet History    Typical Food/Fluid Intake  pt is still at almost the same times, but he has made changes in the foods he chooses to eat- lower calorie and lower fat foods.  --    Functional Status  --  able to prepare meals;able to purchase food;ambulatory        Anthropometrics     Row Name 10/15/19 1515          Anthropometrics    Weight  128 kg (283 lb) on SDS scales         Labs/Tests/Procedures/Meds     Row Name 10/15/19 1519          Labs/Procedures/Meds    Lab Results Reviewed  reviewed        Medications    Pertinent Medications Reviewed  reviewed         Physical Findings     Row Name 10/15/19 1515          Physical Findings    Overall Physical Appearance  obese                     Electronically signed by:  Kerrie Medeiros RD  10/15/19 3:22 PM

## 2020-02-13 ENCOUNTER — OFFICE VISIT (OUTPATIENT)
Dept: FAMILY MEDICINE CLINIC | Facility: CLINIC | Age: 35
End: 2020-02-13

## 2020-02-13 VITALS
HEART RATE: 60 BPM | DIASTOLIC BLOOD PRESSURE: 82 MMHG | WEIGHT: 281 LBS | BODY MASS INDEX: 41.48 KG/M2 | SYSTOLIC BLOOD PRESSURE: 128 MMHG | OXYGEN SATURATION: 98 %

## 2020-02-13 DIAGNOSIS — F41.9 ANXIETY AND DEPRESSION: Primary | ICD-10-CM

## 2020-02-13 DIAGNOSIS — F32.A ANXIETY AND DEPRESSION: Primary | ICD-10-CM

## 2020-02-13 PROCEDURE — 99213 OFFICE O/P EST LOW 20 MIN: CPT | Performed by: STUDENT IN AN ORGANIZED HEALTH CARE EDUCATION/TRAINING PROGRAM

## 2020-02-14 NOTE — PROGRESS NOTES
I have reviewed the notes, assessments, and/or procedures performed by Dr. Gee, I concur with her/his documentation and assessment and plan for Barrett Sidhu.       This document has been electronically signed by Ralf hCavez MD on February 14, 2020 11:19 AM

## 2020-04-20 RX ORDER — ESCITALOPRAM OXALATE 10 MG/1
10 TABLET ORAL DAILY
Qty: 30 TABLET | Refills: 5 | Status: SHIPPED | OUTPATIENT
Start: 2020-04-20 | End: 2020-12-02

## 2020-08-11 ENCOUNTER — OFFICE VISIT (OUTPATIENT)
Dept: FAMILY MEDICINE CLINIC | Facility: CLINIC | Age: 35
End: 2020-08-11

## 2020-08-11 VITALS
HEART RATE: 75 BPM | WEIGHT: 284 LBS | TEMPERATURE: 96.9 F | OXYGEN SATURATION: 99 % | DIASTOLIC BLOOD PRESSURE: 88 MMHG | HEIGHT: 69 IN | BODY MASS INDEX: 42.06 KG/M2 | SYSTOLIC BLOOD PRESSURE: 130 MMHG

## 2020-08-11 DIAGNOSIS — F41.9 ANXIETY: Primary | ICD-10-CM

## 2020-08-11 PROCEDURE — 99213 OFFICE O/P EST LOW 20 MIN: CPT | Performed by: STUDENT IN AN ORGANIZED HEALTH CARE EDUCATION/TRAINING PROGRAM

## 2020-08-11 NOTE — PROGRESS NOTES
Family Medicine Residency  Herbert Gee MD    Subjective:     Barrett Sidhu is a 35 y.o. male who presents for anxiety follow up. He continues to take Lexapro 10mg daily with good control of symptoms. If he feels an attack coming on he will take a deep breath or walk away and that resolves the episodes.    He is not monitoring BP at home. He is not exercising, and started drinking soda again since he started working nights again but is working to improve lifestyle.    Of note mother had cholecystectomy in February and was diagnosed with several heart problems a few months ago and is now living with him which has been stressful for him especially in light of the COVID-19 crisis. He is coping well however.     The following portions of the patient's history were reviewed and updated as appropriate: allergies, current medications, past family history, past medical history, past social history, past surgical history and problem list.    Past Medical Hx:  Past Medical History:   Diagnosis Date   • Anxiety    • GERD (gastroesophageal reflux disease)        Past Surgical Hx:  History reviewed. No pertinent surgical history.    Current Meds:    Current Outpatient Medications:   •  escitalopram (LEXAPRO) 10 MG tablet, TAKE 1 TABLET BY MOUTH DAILY, Disp: 30 tablet, Rfl: 5  •  omeprazole OTC (PRILOSEC OTC) 20 MG EC tablet, Take 1 tablet by mouth Daily., Disp: 30 tablet, Rfl: 2    Allergies:  No Known Allergies    Family Hx:  Family History   Problem Relation Age of Onset   • Hypertension Mother    • Cancer Maternal Aunt    • Diabetes Maternal Grandmother    • Cancer Maternal Grandfather         Social History:  Social History     Socioeconomic History   • Marital status: Single     Spouse name: Not on file   • Number of children: Not on file   • Years of education: Not on file   • Highest education level: Not on file   Occupational History   • Occupation: [employer]   Tobacco Use   • Smoking status: Former Smoker  "    Last attempt to quit: 2018     Years since quittin.0   • Smokeless tobacco: Never Used   Substance and Sexual Activity   • Alcohol use: No     Frequency: Never     Comment: rare   • Drug use: Yes     Types: Marijuana   • Sexual activity: Not Currently       Review of Systems  Review of Systems   Constitutional: Negative for activity change, appetite change and fever.   HENT: Negative for congestion and trouble swallowing.    Respiratory: Negative for chest tightness and shortness of breath.    Cardiovascular: Negative for chest pain and palpitations.   Gastrointestinal: Negative for abdominal distention and abdominal pain.   Genitourinary: Negative for difficulty urinating and dysuria.   Musculoskeletal: Negative for arthralgias and myalgias.   Skin: Negative for color change and pallor.   Neurological: Negative for weakness, light-headedness and headaches.   Psychiatric/Behavioral: Negative for agitation and behavioral problems. The patient is not nervous/anxious.        Objective:     /88   Pulse 75   Temp 96.9 °F (36.1 °C)   Ht 175.3 cm (69\")   Wt 129 kg (284 lb)   SpO2 99%   BMI 41.94 kg/m²   Physical Exam   Constitutional: He is oriented to person, place, and time. He appears well-developed and well-nourished.   HENT:   Head: Normocephalic and atraumatic.   Right Ear: External ear normal.   Left Ear: External ear normal.   Nose: Nose normal.   Eyes: Pupils are equal, round, and reactive to light. EOM are normal.   Neck: Normal range of motion. Neck supple.   Cardiovascular: Normal rate, regular rhythm and normal heart sounds. Exam reveals no gallop and no friction rub.   No murmur heard.  Pulmonary/Chest: Effort normal and breath sounds normal. No respiratory distress. He has no wheezes. He has no rales.   Abdominal: Soft. Bowel sounds are normal. He exhibits no distension. There is no tenderness.   Musculoskeletal: Normal range of motion. He exhibits no edema.   Neurological: He is " alert and oriented to person, place, and time.   Skin: Skin is warm. Capillary refill takes less than 2 seconds. No erythema.   Psychiatric: He has a normal mood and affect. His behavior is normal.        Assessment/Plan:     Barrett was seen today for anxiety and hypertension.    Diagnoses and all orders for this visit:    Anxiety        - Continue Lexapro 10mg daily      · Rx changes: None  · Patient Education: Continue current medications and coping mechanisms for anxiety. Work on Lifestyle modifications.   · Compliance at present is estimated to be excellent.   · Efforts to improve compliance (if necessary) will be directed at unnecessary at this time.     Follow-up:     Return in about 1 year (around 8/11/2021). For routine follow up.    Preventative:  Health Maintenance   Topic Date Due   • ANNUAL PHYSICAL  06/05/1989   • TDAP/TD VACCINES (2 - Tdap) 06/05/1997   • HEPATITIS C SCREENING  08/08/2018   • INFLUENZA VACCINE  08/01/2020       Recommended: none  Vaccine Counseling: N/A    Weight  -Class: Obese Class III extreme obesity: > or equal to 40kg/m2  -Patient's Body mass index is 41.94 kg/m². BMI is above normal parameters. Recommendations include: exercise counseling and nutrition counseling.   eat more fruits and vegetables, decrease soda or juice intake, increase water intake and increase physical activity    Alcohol use:  reports that he does not drink alcohol.  Nicotine status  reports that he quit smoking about 2 years ago. He has never used smokeless tobacco.    Goals     • Exercise 150 minutes per week (moderate activity)     • Weight (lb) < 113 kg (250 lb)           RISK SCORE: 3          This document has been electronically signed by Herbert Gee MD on August 11, 2020 16:56

## 2020-08-12 NOTE — PROGRESS NOTES
I have spoken with the patient.  I have reviewed the notes, assessments, and/or procedures performed by Dr. Herbert Gee, I concur with his  documentation and assessment and plan for Barrett Sidhu.          This document has been electronically signed by Rajiv Mayes MD on August 12, 2020 11:20

## 2020-12-02 RX ORDER — ESCITALOPRAM OXALATE 10 MG/1
10 TABLET ORAL DAILY
Qty: 30 TABLET | Refills: 11 | Status: SHIPPED | OUTPATIENT
Start: 2020-12-02 | End: 2021-12-20 | Stop reason: SDUPTHER

## 2021-08-13 ENCOUNTER — LAB (OUTPATIENT)
Dept: LAB | Facility: HOSPITAL | Age: 36
End: 2021-08-13

## 2021-08-13 ENCOUNTER — OFFICE VISIT (OUTPATIENT)
Dept: FAMILY MEDICINE CLINIC | Facility: CLINIC | Age: 36
End: 2021-08-13

## 2021-08-13 VITALS
SYSTOLIC BLOOD PRESSURE: 130 MMHG | DIASTOLIC BLOOD PRESSURE: 90 MMHG | WEIGHT: 296.8 LBS | HEIGHT: 69 IN | BODY MASS INDEX: 43.96 KG/M2 | OXYGEN SATURATION: 98 % | TEMPERATURE: 96.4 F | HEART RATE: 83 BPM

## 2021-08-13 DIAGNOSIS — Z00.00 ANNUAL PHYSICAL EXAM: Primary | ICD-10-CM

## 2021-08-13 DIAGNOSIS — F32.A MILD DEPRESSION: ICD-10-CM

## 2021-08-13 PROCEDURE — 99395 PREV VISIT EST AGE 18-39: CPT | Performed by: STUDENT IN AN ORGANIZED HEALTH CARE EDUCATION/TRAINING PROGRAM

## 2021-08-16 NOTE — PROGRESS NOTES
Family Medicine Residency  Nayeli Oneal MD    Subjective:     Barrett Sidhu is a 36 y.o. male who presents for annual physical and follow-up of depression.  Patient is prescribed Lexapro 10 mg.  Reports that he took himself off of this medication in January.  He was doing well until he was laid off of his job and then difficulty with depressive episode.  He then put himself back on Lexapro reports he is doing much better.  He is also back to working.  Reports that he will be going back to the gym soon as well.  Otherwise he has no concerns.    The following portions of the patient's history were reviewed and updated as appropriate: allergies, current medications, past family history, past medical history, past social history, past surgical history and problem list.    Past Medical Hx:  Past Medical History:   Diagnosis Date   • Anxiety    • GERD (gastroesophageal reflux disease)        Past Surgical Hx:  History reviewed. No pertinent surgical history.    Current Meds:    Current Outpatient Medications:   •  escitalopram (LEXAPRO) 10 MG tablet, TAKE 1 TABLET BY MOUTH DAILY, Disp: 30 tablet, Rfl: 11  •  omeprazole OTC (PRILOSEC OTC) 20 MG EC tablet, Take 1 tablet by mouth Daily., Disp: 30 tablet, Rfl: 2    Allergies:  No Known Allergies    Family Hx:  Family History   Problem Relation Age of Onset   • Hypertension Mother    • Cancer Maternal Aunt    • Diabetes Maternal Grandmother    • Cancer Maternal Grandfather         Social History:  Social History     Socioeconomic History   • Marital status: Single     Spouse name: Not on file   • Number of children: Not on file   • Years of education: Not on file   • Highest education level: Not on file   Tobacco Use   • Smoking status: Former Smoker     Quit date: 7/25/2018     Years since quitting: 3.0   • Smokeless tobacco: Never Used   Vaping Use   • Vaping Use: Never used   Substance and Sexual Activity   • Alcohol use: No     Comment: rare   • Drug use: Yes  "    Types: Marijuana   • Sexual activity: Not Currently       Review of Systems  Review of Systems   Constitutional: Negative for activity change, appetite change, chills, fatigue and fever.   HENT: Negative for congestion, rhinorrhea, sinus pain and sore throat.    Eyes: Negative for pain, redness and visual disturbance.   Respiratory: Negative for cough, shortness of breath and wheezing.    Cardiovascular: Negative for chest pain, palpitations and leg swelling.   Gastrointestinal: Negative for abdominal pain, constipation, diarrhea, nausea and vomiting.   Genitourinary: Negative for dysuria and flank pain.   Musculoskeletal: Negative for arthralgias, joint swelling and myalgias.   Skin: Negative for color change, pallor and rash.   Neurological: Negative for dizziness, numbness and headaches.   Psychiatric/Behavioral: Negative for dysphoric mood and sleep disturbance. The patient is not nervous/anxious.        Objective:     /90   Pulse 83   Temp 96.4 °F (35.8 °C)   Ht 175.3 cm (69\")   Wt 135 kg (296 lb 12.8 oz)   SpO2 98%   BMI 43.83 kg/m²   Physical Exam  Constitutional:       General: He is not in acute distress.     Appearance: Normal appearance. He is well-developed. He is not diaphoretic.   HENT:      Head: Normocephalic and atraumatic.      Right Ear: Hearing normal.      Left Ear: Hearing normal.   Eyes:      General: Lids are normal.      Conjunctiva/sclera: Conjunctivae normal.   Cardiovascular:      Rate and Rhythm: Normal rate and regular rhythm.      Heart sounds: Normal heart sounds.   Pulmonary:      Effort: Pulmonary effort is normal. No respiratory distress.      Breath sounds: Normal breath sounds. No wheezing or rales.   Abdominal:      General: Bowel sounds are normal. There is no distension.      Palpations: Abdomen is soft.      Tenderness: There is no abdominal tenderness. There is no guarding.   Musculoskeletal:         General: No tenderness or deformity. Normal range of " motion.      Right lower leg: No edema.      Left lower leg: No edema.   Skin:     General: Skin is warm and dry.      Coloration: Skin is not pale.      Findings: No erythema or rash.   Neurological:      Mental Status: He is alert and oriented to person, place, and time. He is not disoriented.   Psychiatric:         Speech: Speech normal.         Behavior: Behavior normal.          Assessment/Plan:     Diagnoses and all orders for this visit:    1. Annual physical exam (Primary)  -     CBC w AUTO Differential; Future  -     Comprehensive metabolic panel; Future  -     Hepatitis C antibody; Future    2. Mild depression (CMS/HCC)    1.  Doing well, no concerns at this time. Encourage healthy diet and exercise.  Encourage patient to stay up to date on screening examinations as indicated based on age and risk factors.    2.  Chronic, well controlled.  Continue Lexapro 10 mg.  Follow-up in 6 months or sooner if needed.    · Rx changes: none  · Patient Education: see a/p  · Compliance at present is estimated to be good.     Follow-up:     Return in about 6 months (around 2/13/2022) for Recheck.    Preventative:  Health Maintenance   Topic Date Due   • TDAP/TD VACCINES (2 - Tdap) 03/28/2011   • HEPATITIS C SCREENING  Never done   • COVID-19 Vaccine (2 - Moderna 2-dose series) 08/29/2021   • INFLUENZA VACCINE  10/01/2021   • ANNUAL PHYSICAL  08/14/2022   • Pneumococcal Vaccine 0-64  Aged Out     Male Preventative: annual physical, hep c completed  Recommended: DTaP  Vaccine Counseling: N/A    Weight  -Class: Obese Class III extreme obesity: > or equal to 40kg/m2  -Patient's Body mass index is 43.83 kg/m². indicating that he is morbidly obese (BMI > 40 or > 35 with obesity - related health condition). Obesity-related health conditions include the following: none.  We discussed portion control and increasing exercise..   eat more fruits and vegetables, decrease soda or juice intake, increase water intake, increase physical  activity, reduce screen time, reduce portion size and cut out extra servings    Alcohol use:  reports no history of alcohol use.  Nicotine status  reports that he quit smoking about 3 years ago. He has never used smokeless tobacco.    Goals     • Exercise 150 minutes per week (moderate activity)     • Weight (lb) < 113 kg (250 lb)           RISK SCORE: 3      Nayeli Oneal M.D. PGY3  [facility] Medicine Residency  [address] [place] [address]  Office: [phone]  This document has been electronically signed by Nayeli Oneal MD on August 16, 2021 00:38 CDT

## 2021-08-17 NOTE — PROGRESS NOTES
I have reviewed the patient.  I have reviewed the notes, assessments, and/or procedures performed by Dr Nayeli Oneal, I concur with his  documentation and assessment and plan for Barrett Sidhu.          This document has been electronically signed by Rajiv Mayes MD on August 17, 2021 16:28 CDT

## 2021-12-20 RX ORDER — ESCITALOPRAM OXALATE 10 MG/1
10 TABLET ORAL DAILY
Qty: 30 TABLET | Refills: 11 | Status: SHIPPED | OUTPATIENT
Start: 2021-12-20

## 2021-12-20 NOTE — TELEPHONE ENCOUNTER
Incoming Refill Request      Medication requested (name and dose):   escitalopram (LEXAPRO) 10 MG tablet    Pharmacy where request should be sent:   [id number] [address] AT [facility] - [phone] [facility] [phone]    Additional details provided by patient:     Best call back number: [phone]    Does the patient have less than a 3 day supply:  [x] Yes  [] No    Rocio Dempsey  12/20/21, 13:22 CST